# Patient Record
Sex: MALE | Race: WHITE | NOT HISPANIC OR LATINO | Employment: FULL TIME | URBAN - METROPOLITAN AREA
[De-identification: names, ages, dates, MRNs, and addresses within clinical notes are randomized per-mention and may not be internally consistent; named-entity substitution may affect disease eponyms.]

---

## 2020-05-11 ENCOUNTER — OFFICE VISIT (OUTPATIENT)
Dept: URGENT CARE | Facility: CLINIC | Age: 40
End: 2020-05-11
Payer: COMMERCIAL

## 2020-05-11 VITALS
SYSTOLIC BLOOD PRESSURE: 129 MMHG | TEMPERATURE: 98.9 F | DIASTOLIC BLOOD PRESSURE: 85 MMHG | BODY MASS INDEX: 28.07 KG/M2 | RESPIRATION RATE: 18 BRPM | WEIGHT: 164.4 LBS | OXYGEN SATURATION: 99 % | HEIGHT: 64 IN | HEART RATE: 91 BPM

## 2020-05-11 DIAGNOSIS — Z20.822 SUSPECTED COVID-19 VIRUS INFECTION: Primary | ICD-10-CM

## 2020-05-11 DIAGNOSIS — R05.9 COUGH: ICD-10-CM

## 2020-05-11 DIAGNOSIS — R06.02 SHORTNESS OF BREATH: ICD-10-CM

## 2020-05-11 PROCEDURE — U0003 INFECTIOUS AGENT DETECTION BY NUCLEIC ACID (DNA OR RNA); SEVERE ACUTE RESPIRATORY SYNDROME CORONAVIRUS 2 (SARS-COV-2) (CORONAVIRUS DISEASE [COVID-19]), AMPLIFIED PROBE TECHNIQUE, MAKING USE OF HIGH THROUGHPUT TECHNOLOGIES AS DESCRIBED BY CMS-2020-01-R: HCPCS | Performed by: NURSE PRACTITIONER

## 2020-05-11 PROCEDURE — 99203 OFFICE O/P NEW LOW 30 MIN: CPT | Performed by: NURSE PRACTITIONER

## 2020-05-11 RX ORDER — ALBUTEROL SULFATE 90 UG/1
2 AEROSOL, METERED RESPIRATORY (INHALATION) EVERY 4 HOURS PRN
Qty: 8.5 G | Refills: 0 | Status: SHIPPED | OUTPATIENT
Start: 2020-05-11

## 2020-05-13 ENCOUNTER — TELEPHONE (OUTPATIENT)
Dept: URGENT CARE | Facility: CLINIC | Age: 40
End: 2020-05-13

## 2020-05-13 LAB — SARS-COV-2 RNA SPEC QL NAA+PROBE: NOT DETECTED

## 2020-05-16 ENCOUNTER — TELEPHONE (OUTPATIENT)
Dept: URGENT CARE | Facility: CLINIC | Age: 40
End: 2020-05-16

## 2020-05-16 DIAGNOSIS — B96.89 ACUTE BACTERIAL BRONCHITIS: Primary | ICD-10-CM

## 2020-05-16 DIAGNOSIS — J20.8 ACUTE BACTERIAL BRONCHITIS: Primary | ICD-10-CM

## 2020-05-16 RX ORDER — BENZONATATE 100 MG/1
100 CAPSULE ORAL 3 TIMES DAILY PRN
Qty: 20 CAPSULE | Refills: 0 | Status: SHIPPED | OUTPATIENT
Start: 2020-05-16

## 2020-05-16 RX ORDER — AZITHROMYCIN 250 MG/1
TABLET, FILM COATED ORAL
Qty: 6 TABLET | Refills: 0 | Status: SHIPPED | OUTPATIENT
Start: 2020-05-16 | End: 2020-05-21

## 2020-05-27 ENCOUNTER — TELEPHONE (OUTPATIENT)
Dept: URGENT CARE | Facility: CLINIC | Age: 40
End: 2020-05-27

## 2021-04-01 DIAGNOSIS — Z23 ENCOUNTER FOR IMMUNIZATION: ICD-10-CM

## 2021-04-21 ENCOUNTER — IMMUNIZATIONS (OUTPATIENT)
Dept: FAMILY MEDICINE CLINIC | Facility: HOSPITAL | Age: 41
End: 2021-04-21

## 2021-04-21 DIAGNOSIS — Z23 ENCOUNTER FOR IMMUNIZATION: Primary | ICD-10-CM

## 2021-04-21 PROCEDURE — 91300 SARS-COV-2 / COVID-19 MRNA VACCINE (PFIZER-BIONTECH) 30 MCG: CPT

## 2021-04-21 PROCEDURE — 0001A SARS-COV-2 / COVID-19 MRNA VACCINE (PFIZER-BIONTECH) 30 MCG: CPT

## 2021-05-15 ENCOUNTER — IMMUNIZATIONS (OUTPATIENT)
Dept: FAMILY MEDICINE CLINIC | Facility: HOSPITAL | Age: 41
End: 2021-05-15

## 2021-05-15 DIAGNOSIS — Z23 ENCOUNTER FOR IMMUNIZATION: Primary | ICD-10-CM

## 2021-05-15 PROCEDURE — 91300 SARS-COV-2 / COVID-19 MRNA VACCINE (PFIZER-BIONTECH) 30 MCG: CPT

## 2021-05-15 PROCEDURE — 0002A SARS-COV-2 / COVID-19 MRNA VACCINE (PFIZER-BIONTECH) 30 MCG: CPT

## 2021-06-03 ENCOUNTER — OFFICE VISIT (OUTPATIENT)
Dept: URGENT CARE | Facility: CLINIC | Age: 41
End: 2021-06-03
Payer: COMMERCIAL

## 2021-06-03 VITALS
HEIGHT: 66 IN | BODY MASS INDEX: 25.71 KG/M2 | OXYGEN SATURATION: 97 % | TEMPERATURE: 97.7 F | DIASTOLIC BLOOD PRESSURE: 84 MMHG | RESPIRATION RATE: 18 BRPM | SYSTOLIC BLOOD PRESSURE: 151 MMHG | WEIGHT: 160 LBS | HEART RATE: 102 BPM

## 2021-06-03 DIAGNOSIS — R53.83 FATIGUE, UNSPECIFIED TYPE: Primary | ICD-10-CM

## 2021-06-03 DIAGNOSIS — B34.9 VIRAL SYNDROME: ICD-10-CM

## 2021-06-03 PROCEDURE — G0382 LEV 3 HOSP TYPE B ED VISIT: HCPCS | Performed by: NURSE PRACTITIONER

## 2021-06-03 PROCEDURE — U0005 INFEC AGEN DETEC AMPLI PROBE: HCPCS | Performed by: NURSE PRACTITIONER

## 2021-06-03 PROCEDURE — S9083 URGENT CARE CENTER GLOBAL: HCPCS | Performed by: NURSE PRACTITIONER

## 2021-06-03 PROCEDURE — U0003 INFECTIOUS AGENT DETECTION BY NUCLEIC ACID (DNA OR RNA); SEVERE ACUTE RESPIRATORY SYNDROME CORONAVIRUS 2 (SARS-COV-2) (CORONAVIRUS DISEASE [COVID-19]), AMPLIFIED PROBE TECHNIQUE, MAKING USE OF HIGH THROUGHPUT TECHNOLOGIES AS DESCRIBED BY CMS-2020-01-R: HCPCS | Performed by: NURSE PRACTITIONER

## 2021-06-03 RX ORDER — BENZONATATE 100 MG/1
100 CAPSULE ORAL 3 TIMES DAILY PRN
Qty: 20 CAPSULE | Refills: 0 | Status: SHIPPED | OUTPATIENT
Start: 2021-06-03

## 2021-06-03 NOTE — PATIENT INSTRUCTIONS
Will test for COVID-19  You need to isolate into results are obtain  Rest and drink extra fluids  Tylenol as needed for pain or fever  Over-the-counter cough and cold medications as needed  Start vitamin D3 2000 IU po daily, Vitamin C 1 gram PO q 12 hours and multivitamin daily  Follow up with PCP if no improvement, call prior to any doctor visits if COVID testing is not back  Go to the ER with any worsening symptoms, chest pain, shortness of breath, difficulty breathing, lethargy, confusion, dehydration or change in skin color  You can try imodium for diarrhea  [place] foods and clear liquids      COVID-19 Home Care Guidelines    Your healthcare provider and/or public health staff have evaluated you and have determined that you do not need to remain in the hospital at this time  At this time you can be isolated at home where you will be monitored by staff from your local or state health department  You should carefully follow the prevention and isolation steps below until a healthcare provider or local or state health department says that you can return to your normal activities  Stay home except to get medical care    People who are mildly ill with COVID-19 are able to isolate at home during their illness  You should restrict activities outside your home, except for getting medical care  Do not go to work, school, or public areas  Avoid using public transportation, ride-sharing, or taxis  Separate yourself from other people and animals in your home    People: As much as possible, you should stay in a specific room and away from other people in your home  Also, you should use a separate bathroom, if available  Animals: You should restrict contact with pets and other animals while you are sick with COVID-19, just like you would around other people   Although there have not been reports of pets or other animals becoming sick with COVID-19, it is still recommended that people sick with COVID-19 limit contact with animals until more information is known about the virus  When possible, have another member of your household care for your animals while you are sick  If you are sick with COVID-19, avoid contact with your pet, including petting, snuggling, being kissed or licked, and sharing food  If you must care for your pet or be around animals while you are sick, wash your hands before and after you interact with pets and wear a facemask  See COVID-19 and Animals for more information  Call ahead before visiting your doctor    If you have a medical appointment, call the healthcare provider and tell them that you have or may have COVID-19  This will help the healthcare providers office take steps to keep other people from getting infected or exposed  Wear a facemask    You should wear a facemask when you are around other people (e g , sharing a room or vehicle) or pets and before you enter a healthcare providers office  If you are not able to wear a facemask (for example, because it causes trouble breathing), then people who live with you should not stay in the same room with you, or they should wear a facemask if they enter your room  Cover your coughs and sneezes    Cover your mouth and nose with a tissue when you cough or sneeze  Throw used tissues in a lined trash can  Immediately wash your hands with soap and water for at least 20 seconds or, if soap and water are not available, clean your hands with an alcohol-based hand  that contains at least 60% alcohol  Clean your hands often    Wash your hands often with soap and water for at least 20 seconds, especially after blowing your nose, coughing, or sneezing; going to the bathroom; and before eating or preparing food  If soap and water are not readily available, use an alcohol-based hand  with at least 60% alcohol, covering all surfaces of your hands and rubbing them together until they feel dry    Soap and water are the best option if hands are visibly dirty  Avoid touching your eyes, nose, and mouth with unwashed hands  Avoid sharing personal household items    You should not share dishes, drinking glasses, cups, eating utensils, towels, or bedding with other people or pets in your home  After using these items, they should be washed thoroughly with soap and water  Clean all high-touch surfaces everyday    High touch surfaces include counters, tabletops, doorknobs, bathroom fixtures, toilets, phones, keyboards, tablets, and bedside tables  Also, clean any surfaces that may have blood, stool, or body fluids on them  Use a household cleaning spray or wipe, according to the label instructions  Labels contain instructions for safe and effective use of the cleaning product including precautions you should take when applying the product, such as wearing gloves and making sure you have good ventilation during use of the product  Monitor your symptoms    Seek prompt medical attention if your illness is worsening (e g , difficulty breathing)  Before seeking care, call your healthcare provider and tell them that you have, or are being evaluated for, COVID-19  Put on a facemask before you enter the facility  These steps will help the healthcare providers office to keep other people in the office or waiting room from getting infected or exposed  Ask your healthcare provider to call the local or state health department  Persons who are placed under active monitoring or facilitated self-monitoring should follow instructions provided by their local health department or occupational health professionals, as appropriate  If you have a medical emergency and need to call 911, notify the dispatch personnel that you have, or are being evaluated for COVID-19  If possible, put on a facemask before emergency medical services arrive      Discontinuing home isolation    Patients with confirmed COVID-19 should remain under home isolation precautions until the following conditions are met:   - They have had no fever for at least 24 hours (that is one full day of no fever without the use medicine that reduces fevers)  AND  - other symptoms have improved (for example, when their cough or shortness of breath have improved)  AND  - If had mild or moderate illness, at least 10 days have passed since their symptoms first appeared or if severe illness (needed oxygen) or immunosuppressed, at least 20 days have passed since symptoms first appeared  Patients with confirmed COVID-19 should also notify close contacts (including their workplace) and ask that they self-quarantine  Currently, close contact is defined as being within 6 feet for 15 minutes or more from the period 24 hours starting 48 hours before symptom onset to the time at which the patient went into isolation  Close contacts of patients diagnosed with COVID-19 should be instructed by the patient to self-quarantine for 14 days from the last time of their last contact with the patient       Source: [employer]

## 2021-06-03 NOTE — PROGRESS NOTES
[address] [facility]        NAME: Yasmin Chi is a 39 y o  male  : 1980    MRN: [id number]  DATE: Ev 3, 2021  TIME: 1:59 PM    Assessment and Plan   Fatigue, unspecified type [R53 83]  1  Fatigue, unspecified type  Novel Coronavirus (Covid-19),PCR Formerly named [facility] - Office Collection   2  Viral syndrome  benzonatate (TESSALON PERLES) 100 mg capsule         Patient Instructions     Patient Instructions   Will test for COVID-19  You need to isolate into results are obtain  Rest and drink extra fluids  Tylenol as needed for pain or fever  Over-the-counter cough and cold medications as needed  Start vitamin D3 2000 IU po daily, Vitamin C 1 gram PO q 12 hours and multivitamin daily  Follow up with PCP if no improvement, call prior to any doctor visits if COVID testing is not back  Go to the ER with any worsening symptoms, chest pain, shortness of breath, difficulty breathing, lethargy, confusion, dehydration or change in skin color  You can try imodium for diarrhea  [place] foods and clear liquids      COVID-19 Home Care Guidelines    Your healthcare provider and/or public health staff have evaluated you and have determined that you do not need to remain in the hospital at this time  At this time you can be isolated at home where you will be monitored by staff from your local or state health department  You should carefully follow the prevention and isolation steps below until a healthcare provider or local or state health department says that you can return to your normal activities  Stay home except to get medical care    People who are mildly ill with COVID-19 are able to isolate at home during their illness  You should restrict activities outside your home, except for getting medical care  Do not go to work, school, or public areas  Avoid using public transportation, ride-sharing, or taxis      Separate yourself from other people and animals in your home    People: As much as possible, you should stay in a specific room and away from other people in your home  Also, you should use a separate bathroom, if available  Animals: You should restrict contact with pets and other animals while you are sick with COVID-19, just like you would around other people  Although there have not been reports of pets or other animals becoming sick with COVID-19, it is still recommended that people sick with COVID-19 limit contact with animals until more information is known about the virus  When possible, have another member of your household care for your animals while you are sick  If you are sick with COVID-19, avoid contact with your pet, including petting, snuggling, being kissed or licked, and sharing food  If you must care for your pet or be around animals while you are sick, wash your hands before and after you interact with pets and wear a facemask  See COVID-19 and Animals for more information  Call ahead before visiting your doctor    If you have a medical appointment, call the healthcare provider and tell them that you have or may have COVID-19  This will help the healthcare providers office take steps to keep other people from getting infected or exposed  Wear a facemask    You should wear a facemask when you are around other people (e g , sharing a room or vehicle) or pets and before you enter a healthcare providers office  If you are not able to wear a facemask (for example, because it causes trouble breathing), then people who live with you should not stay in the same room with you, or they should wear a facemask if they enter your room  Cover your coughs and sneezes    Cover your mouth and nose with a tissue when you cough or sneeze  Throw used tissues in a lined trash can  Immediately wash your hands with soap and water for at least 20 seconds or, if soap and water are not available, clean your hands with an alcohol-based hand  that contains at least 60% alcohol      Clean your hands often    Wash your hands often with soap and water for at least 20 seconds, especially after blowing your nose, coughing, or sneezing; going to the bathroom; and before eating or preparing food  If soap and water are not readily available, use an alcohol-based hand  with at least 60% alcohol, covering all surfaces of your hands and rubbing them together until they feel dry  Soap and water are the best option if hands are visibly dirty  Avoid touching your eyes, nose, and mouth with unwashed hands  Avoid sharing personal household items    You should not share dishes, drinking glasses, cups, eating utensils, towels, or bedding with other people or pets in your home  After using these items, they should be washed thoroughly with soap and water  Clean all high-touch surfaces everyday    High touch surfaces include counters, tabletops, doorknobs, bathroom fixtures, toilets, phones, keyboards, tablets, and bedside tables  Also, clean any surfaces that may have blood, stool, or body fluids on them  Use a household cleaning spray or wipe, according to the label instructions  Labels contain instructions for safe and effective use of the cleaning product including precautions you should take when applying the product, such as wearing gloves and making sure you have good ventilation during use of the product  Monitor your symptoms    Seek prompt medical attention if your illness is worsening (e g , difficulty breathing)  Before seeking care, call your healthcare provider and tell them that you have, or are being evaluated for, COVID-19  Put on a facemask before you enter the facility  These steps will help the healthcare providers office to keep other people in the office or waiting room from getting infected or exposed  Ask your healthcare provider to call the local or [facility] health department   Persons who are placed under active monitoring or facilitated self-monitoring should follow instructions provided by their local health department or occupational health professionals, as appropriate  If you have a medical emergency and need to call 911, notify the dispatch personnel that you have, or are being evaluated for COVID-19  If possible, put on a facemask before emergency medical services arrive  Discontinuing home isolation    Patients with confirmed COVID-19 should remain under home isolation precautions until the following conditions are met:   - They have had no fever for at least 24 hours (that is one full day of no fever without the use medicine that reduces fevers)  AND  - other symptoms have improved (for example, when their cough or shortness of breath have improved)  AND  - If had mild or moderate illness, at least 10 days have passed since their symptoms first appeared or if severe illness (needed oxygen) or immunosuppressed, at least 20 days have passed since symptoms first appeared  Patients with confirmed COVID-19 should also notify close contacts (including their workplace) and ask that they self-quarantine  Currently, close contact is defined as being within 6 feet for 15 minutes or more from the period 24 hours starting 48 hours before symptom onset to the time at which the patient went into isolation  Close contacts of patients diagnosed with COVID-19 should be instructed by the patient to self-quarantine for 14 days from the last time of their last contact with the patient  Source: [employer]          Chief Complaint     Chief Complaint   Patient presents with    Headache     started tuesday on and off     Cough     constant cough    Fatigue     also got loose stool since yesterday         History of Present Illness   Anaid Sang presents to the clinic c/o    This is a 30-year-old male here today with complaints of cough, slight congestion, fatigue, fever and diarrhea    He has had 3 episodes of loose stools today   Fever 102 2 days ago  He denies any loss of taste or smell  He does note he had his 2nd COVID vaccine on 05/15/2021  He then had an exposure to COVID-19 about 1 5 weeks ago  Symptoms started 2 days ago  He denies any chest pain or shortness of breath  Review of Systems   Review of Systems   Constitutional: Positive for activity change, chills, fatigue and fever  HENT: Positive for congestion and rhinorrhea  Negative for sore throat  Respiratory: Positive for cough  Negative for shortness of breath and wheezing  Cardiovascular: Negative  Negative for palpitations  Gastrointestinal: Positive for diarrhea  Negative for abdominal pain and vomiting  Musculoskeletal: Negative  Neurological: Negative  Psychiatric/Behavioral: Negative  Current Medications     No long-term medications on file  Current Allergies     Allergies as of 06/03/2021    (No Known Allergies)            The following portions of the patient's history were reviewed and updated as appropriate: allergies, current medications, past family history, past medical history, past social history, past surgical history and problem list     Objective   /84   Pulse 102   Temp 97 7 °F (36 5 °C)   Resp 18   Ht 5' 6" (1 676 m)   Wt 72 6 kg (160 lb)   SpO2 97%   BMI 25 82 kg/m²        Physical Exam     Physical Exam  Vitals signs and nursing note reviewed  Constitutional:       General: He is not in acute distress  Appearance: Normal appearance  He is not ill-appearing  HENT:      Right Ear: Tympanic membrane normal       Left Ear: Tympanic membrane normal       Nose: Congestion present  Cardiovascular:      Rate and Rhythm: Normal rate and regular rhythm  Pulses: Normal pulses  Heart sounds: Normal heart sounds  Pulmonary:      Effort: Pulmonary effort is normal  No respiratory distress  Breath sounds: Normal breath sounds  No wheezing, rhonchi or rales     Chest:      Chest wall: No tenderness  Neurological:      Mental Status: He is alert and oriented to person, place, and time  Psychiatric:         Mood and Affect: Mood normal          Behavior: Behavior normal          Thought Content:  Thought content normal          Judgment: Judgment normal

## 2021-06-04 LAB — SARS-COV-2 RNA RESP QL NAA+PROBE: NEGATIVE

## 2021-06-07 ENCOUNTER — DOCUMENTATION (OUTPATIENT)
Dept: URGENT CARE | Facility: CLINIC | Age: 41
End: 2021-06-07

## 2021-06-07 NOTE — LETTER
June 7, 2021    Patient:  Johnie Leonard  YOB: 1980  Date of Last Encounter: 6/3/2021    To whom it may concern:    Johnie Leonard has tested negative for COVID-19 (Coronavirus)  He may return to work on 6/8/2021        Sincerely,        Bethany Woods RN

## 2021-06-07 NOTE — PROGRESS NOTES
Pt came in for a printed copy of his Lab results and a work note  Provider was notified , RN wrote note

## 2021-06-08 ENCOUNTER — TELEPHONE (OUTPATIENT)
Dept: URGENT CARE | Facility: CLINIC | Age: 41
End: 2021-06-08

## 2021-06-09 ENCOUNTER — TELEPHONE (OUTPATIENT)
Dept: URGENT CARE | Facility: CLINIC | Age: 41
End: 2021-06-09

## 2021-06-09 NOTE — TELEPHONE ENCOUNTER
Patient called in requesting a note at including today's date as he was unable to return to work today due to not having a note yesterday  Will issue patient a new note

## 2021-06-09 NOTE — LETTER
June 9, 2021    Patient:  Betty Fuentes  YOB: 1980  Date of Last Encounter: 6/8/2021    To whom it may concern:    Betty Fuentes has tested negative for COVID-19 (Coronavirus)  He may return to work on 06/09/2021, please excuse from 06/01/2021-06/08/2021      Sincerely,        [address] [facility], CRNP

## 2021-06-09 NOTE — LETTER
June 9, 2021    Patient:  Dom Chavez  YOB: 1980  Date of Last Encounter: 6/9/2021    To whom it may concern:    Dom Chavez has tested negative for COVID-19 (Coronavirus)  He may return to work on 06/10/2021, please excuse from 06/01/2021-06/09/2021      Sincerely,        [address] [facility], CRNP

## 2021-08-18 ENCOUNTER — AMB VIDEO VISIT (OUTPATIENT)
Dept: OTHER | Facility: HOSPITAL | Age: 41
End: 2021-08-18

## 2021-08-18 PROCEDURE — ECARE PR SL URGENT CARE VIRTUAL VISIT: Performed by: FAMILY MEDICINE

## 2021-08-18 NOTE — CARE ANYWHERE EVISITS
Visit Summary for Jessica Richardson - Gender: Male - Date of Birth: [id number]  Date: [id number] - Duration: 1 minutes  Patient: Jessica Mares   [place]  Provider: Valerie Bosch    Patient Contact Information  Address  [address] [place]; [place] [address]  [phone]    Visit Topics    Sick Slip  Reason [ILLNESS]  Remarks [Please excuse from work due to COVID-like symptoms  Self-isolation has been advised  COVID-19 testing has been recommended  If positive test result, may stop self-isolating when:No fever for at least 24 hours without   fever-reducing medicinesANDOther symptoms have improved (such as cough or shortness of breath) ANDAt least 10 days have passed since symptoms first appeared  If you negative test result, may stop self-isolating when:No fever for at least 24   hours without fever-reducing medicinesANDOther symptoms have improved (such as cough or shortness of breath)  ]  Triage Questions   What is your current physical address in the event of a medical emergency? Answer []  Are you allergic to any medications? Answer []  Are you now or could you be pregnant? Answer []  Do you have any immune system compromise or chronic lung   disease? Answer []  Do you have any vulnerable family members in the home (infant, pregnant, cancer, elderly)? Answer []     Conversation Transcripts  [0A][0A] [Notification] Practice Staff, Global Staff, will help you prepare for your visit  She is assisting Valerie Bosch, Family Physician [0A][Practice Staff] Hello, and thank you for connecting  While you are waiting for the doctor, are there   any questions I can answer for you about our service? Please contact customer service if you have questions about billing, insurance, or technical issues  Visits work best with a stable WiFi connection, so please make sure you are connected before we   begin [0A][Notification] Practice Staff has left the room  [0A][Notification] You are connected with Valerie Bosch Family Physician [0A][Notification] Alfonzo Don is located in [place]  [0A][Notification] Alfonzo Don has shared   health history  Jesus Rodríguez  [0A]    Diagnosis  Cough  Headache  Acute upper respiratory infection, unspecified  Contact w and exposure to oth viral communicable diseases    Procedures  Value: [id number] Code: CPT-4 UNLISTED E&M SERVICE    Medications Prescribed    benzonatate  Dose : 1 capsule  Route : oral  Frequency : 3 times a day  Until directed to stop  Patient Instructions : prn cough; do not crush or chew  Refills : 0  Instructions to the Pharmacist : Substitutions allowed    Ventolin HFA  Dose : 2 puffs  Route : inhalation  Frequency : every 4 hours  Until directed to stop  Patient Instructions : prn cough/wheeze/SOB  Refills : 0  Instructions to the Pharmacist : Substitutions allowed      Provider Notes  [0A][0A] We strongly encourage you to share the following record of[0A]today's visit with your primary care physician  [0A]Contact phone[0A]number-[0A]Mode of[0A]Communication-Video then Phone d/t video   disconnection[0A][0A][0A]Chief[0A]complaint/HPI-Onset of symptoms 3 days ago  Reports being fully vaccinated for COVID  [0A]Pertinent symptoms[0A]During this illness, did the patient experience any of the following[0A]symptoms (yes or no,   describe)? [0A]Fever>100 4 F (38 C)-N[0A]Subjective fever (felt feverish)-Y  Chills  [0A]Cough (describe, if yes)-Y  Productive cough  Chest congestion  Some wheezing  [0A]Shortness of breath (describe, if yes)-N[0A]Chest pain or tightness-N[0A]Sore   throat-N[0A]Lost sense of smell or taste (anosmia or ageusia)-[0A]Runny nose/Congestion-Y/Y[0A]Sneezing-N[0A]Muscle aches-[0A]Headache-Y[0A]Nausea or vomiting-N[0A]Diarrhea-N[0A]Other, specify-[0A] [0A]COVID-19 Risks[0A]Does the patient have any of the   following medical[0A]conditions (yes or no, describe)?       [0A]Asthma-N[0A]COPD or emphysema-N[0A]Chronic heart disease-N[0A]Recurrent pneumonia-N[0A]Advanced or poorly controlled diabetes-N[0A]Active or recent cancer-N[0A]Significant immune   suppression-N[0A]Currently taking immunosuppressant medications-N[0A]Age over 65?-N[0A] [0A]Does the patient live with a chronically ill,[0A]immunosuppressed or household member over 72 (yes or no, describe)? -N[0A] [0A]COVID-19 Exposure[0A]Risk[0A]Does   the patient suspect they have been in close contact[0A]with a person with COVID-19 (yes, no describe)? -N[0A] [0A]COVID-19 Test[0A]Results[0A]Has the patient been tested within the last 3 months for[0A]SARS-CoV-2 virus (yes, no, results if yes)? -N[0A]   [0A]Past Medical[0A]History-Allergic rhinitis[0A]Medications-Claritin, Flonase[0A]Allergies-NKDA[0A]Past Surgical[0A]History-[0A]Smoking History-N[0A]If female,[0A]currently pregnant or nursing (yes, no, N/A)? -N/A[0A] [0A]Physical   Exam[0A]General-Slightly ill-appearing, non-toxic, NAD  [0A]HEENT-NCAT  Nose-congestion  Eyes and Ears-normal appearance  MMM and pink  SInuses-NTTP  [0A]Neck/lymph-Supple  [0A]Resp-Frequent tight, paroxysmal cough  No respiratory   distress  [0A]Abdomen-[0A]Skin-No abnormalities of visible skin  [0A]Other exam-[0A] [0A]Assessment[0A]1  COVID-19 risk[0A]    - COVID-Like[0A]Illness, Stable (CLI-S)[0A] [0A]2  Diagnosis and[0A]ICD-10 code[0A]R05 (cough)[0A]R51 9 (headache)[0A]J06 9   (acute URI)[0A]Z20 828 (suspected exposure to other viral communicable[0A]diseases)[0A][0A][0A]Plan[0A]1  COVID-Like Illness Stable (CLI-S)[0A]You have been diagnosed with a viral respiratory infection[0A]that may be due to coronavirus (Covid-19)  At this time, your provider has[0A]determined that you do not require an emergency evaluation  If your symptoms[0A]progress or worsen, it may be necessary to seek additional care in person  [0A] [0A]2  Testing for COVID-19[0A]Lab testing for   COVID-19 might be recommended if it is[0A]available in your community   [0A] [0A]To locate[0A]potential test sites in your area, please search the following   [employer]  [email or web address] [0A][employer]   ([email or web address])[0A][0A][0A]Lab results[0A]Positive test:[0A]These tests are not 100% perfect but if you tested positive for COVID-19, this[0A]simply confirms that COVID-19 is the likely cause of your symptoms  You do not[0A]need to take further action unless you are experiencing worsening of your[0A]condition  You should follow all of the above guidance to avoid infecting[0A]others around you  [0A]If you have a positive[0A]test result you may stop   self-isolating when:[0A]You have had no fever for at least 24 hours without[0A]fever-reducing medicines [0A]AND[0A]Other symptoms have improved (such as cough or shortness of[0A]breath)[0A] AND[0A]At least 10 days have passed since your symptoms   first[0A]appeared[0A]Also, after isolation ends, you will need to be especially[0A]careful to avoid close contact and wear a mask in the presence of other people,[0A]even in the home  [0A]Also, if you return to work outside the home you should[0A]check   your temperature every day prior to starting your shift and only work if[0A]it is normal  [0A]Negative test:[0A]This confirms that COVID-19 is not likely the cause of your symptoms  You[0A]probably are infected with another common respiratory virus  These tests are[0A]not 100% perfect, though, so there is still a small chance that Covid-19 is the[0A]cause of your symptoms  [0A]If you have a[0A]negative test result you may stop self-isolating when:[0A]You have had no fever for at least 24 hours   without[0A]fever-reducing medicines [0A]AND[0A]Other symptoms have improved (such as cough or shortness of[0A]breath)[0A] [0A]3         If you are told to self-isolate, please[0A]do the following:[0A]Stay at home except to receive medical care[0A]Separate   yourself from other people and animals in the home[0A]and if possible, use separate bathrooms[0A]Call ahead before visiting any health facility[0A]Wear a facemask if around others[0A]Cover your coughs and sneezes[0A]Clean your hands often[0A]Clean   âhigh touchâ surfaces every day[0A]Monitor your symptoms for worsening and seek medical[0A]attention immediately if your illness worsens (for example difficulty[0A]breathing, dizziness, dark colored urine)  Before seeking care, call ahead to[0A]the   facility and tell them that you may have Covid-19  Put on a facemask (or[0A]bandana) before entering the facility [0A][0A][0A]Other Patient[0A]Instructions[0A]1  Symptom relief[0A]You may use supportive treatment with in-room humidifier,[0A]fluids,   rest, and Mucinex or other guaifenesin-containing over the counter[0A]cough products  Tylenol and Advil can help with the fever and aches  [0A] [0A]2  Prescriptions (if any)-albuterol hfa, Tessalon Perles (benzonatate)[0A] [0A]3  Prevent   Infection[0A]Please help[0A]prevent the spread of respiratory diseases by doing the following:[0A]Avoid close contact with people who are sick  [0A]Avoid touching your eyes, nose, and mouth  [2R]GIXS home when you are sick  [0A]Cover your cough or sneeze   with a tissue, then throw the[0A]tissue in the trash  [0A]Clean and disinfect frequently touched objects and surfaces[0A]using a regular household cleaning spray or wipe  [0A]Wear a facemask when in public to protect yourself and[0A]others[0A]Wash your   hands often with soap and water for at least 20 seconds,[0A]especially after going to the bathroom; before eating; and after blowing your[0A]nose, coughing, or sneezing  [0A] [0A]4         Follow up[0A]Please reconnect for another online visit or see your   PCP or[0A]urgent care provider if symptoms worsen or new symptoms appear at any point, or[0A]if still with fever or additional symptoms after a 3-4 more days  [0A]If you received a prescription at this visit and have any[0A]questions or problems with the   prescription, call [phone] for assistance  [0A]Patient indicates understanding and agrees with plan  [0A]Please print a copy of this note and send it to your regular[0A]doctor or take it to your next visit so it may be included in your   medical[0A]record  [0A]Please see your PCP on a regular basis for prevention[0A]services, sooner for follow up of chronic medical conditions      [0A][0A][0A] [0A] [0A][0A][0A]    Electronically signed by: Rosa Garcia(NPI [id number])

## 2021-08-27 ENCOUNTER — AMB VIDEO VISIT (OUTPATIENT)
Dept: OTHER | Facility: HOSPITAL | Age: 41
End: 2021-08-27

## 2021-08-27 PROCEDURE — ECARE PR SL URGENT CARE VIRTUAL VISIT: Performed by: PHYSICIAN ASSISTANT

## 2021-08-27 NOTE — CARE ANYWHERE EVISITS
Visit Summary for Stefany Little - Gender: Male - Date of Birth: [id number]  Date: [id number] - Duration: 3 minutes  Patient: Stefany Little  Provider: Sheela Ureña PA-C    Patient Contact Information  Address  [address] [place]; [place]  [phone]    Visit Topics    Triage Questions   What is your current physical address in the event of a medical emergency? Answer []  Are you allergic to any medications? Answer []  Are you now or could you be pregnant? Answer []  Do you have any immune system compromise or chronic lung   disease? Answer []  Do you have any vulnerable family members in the home (infant, pregnant, cancer, elderly)? Answer []     Conversation Transcripts  [0A][0A] [Notification] You are connected with Sheela Ureña PA-C, Urgent Care Specialist [0A][Notification] Stefany Little is located in [place]  [0A][Notification] Stefany Little has shared health history  Nichelle Colunga  [0A]    Diagnosis    Procedures  Value: [id number] Code: CPT-4 UNLISTED E&M SERVICE    Medications Prescribed    No prescriptions ordered    Electronically signed by: Jluis Jett PA-C(NPI [id number])

## 2022-01-04 ENCOUNTER — NURSE TRIAGE (OUTPATIENT)
Dept: OTHER | Facility: OTHER | Age: 42
End: 2022-01-04

## 2022-01-04 DIAGNOSIS — U07.1 COVID-19: Primary | ICD-10-CM

## 2022-01-04 PROCEDURE — U0003 INFECTIOUS AGENT DETECTION BY NUCLEIC ACID (DNA OR RNA); SEVERE ACUTE RESPIRATORY SYNDROME CORONAVIRUS 2 (SARS-COV-2) (CORONAVIRUS DISEASE [COVID-19]), AMPLIFIED PROBE TECHNIQUE, MAKING USE OF HIGH THROUGHPUT TECHNOLOGIES AS DESCRIBED BY CMS-2020-01-R: HCPCS | Performed by: FAMILY MEDICINE

## 2022-01-04 PROCEDURE — U0005 INFEC AGEN DETEC AMPLI PROBE: HCPCS | Performed by: FAMILY MEDICINE

## 2022-01-04 NOTE — TELEPHONE ENCOUNTER
Regarding: Covid-Exposure and Cough  ----- Message from [facility] sent at 1/4/2022 12:10 AM EST -----  "I was exposed to a positive Covid friend and now I have a cough, feel hot, and diarrhea "

## 2022-01-04 NOTE — TELEPHONE ENCOUNTER
Reason for Disposition   [1] COVID-19 infection suspected by caller or triager AND [2] mild symptoms (cough, fever, or others) AND [3] has not gotten tested yet    Answer Assessment - Initial Assessment Questions  1  COVID-19 DIAGNOSIS: "Who made your COVID-19 diagnosis?" "Was it confirmed by a positive lab test?" If not diagnosed by a HCP, ask "Are there lots of cases (community spread) where you live?" Note: See public health department website, if unsure  Increased spread  2  COVID-19 EXPOSURE: "Was there any known exposure to COVID before the symptoms began?" [facility] Definition of close contact: within 6 feet (2 meters) for a total of 15 minutes or more over a 24-hour period  12/28 to friends  3  ONSET: "When did the COVID-19 symptoms start?"       1/1  4  WORST SYMPTOM: "What is your worst symptom?" (e g , cough, fever, shortness of breath, muscle aches)      Cough  5  COUGH: "Do you have a cough?" If Yes, ask: "How bad is the cough?"        Persistent cough  6  FEVER: "Do you have a fever?" If Yes, ask: "What is your temperature, how was it measured, and when did it start?"      100 1  7  RESPIRATORY STATUS: "Describe your breathing?" (e g , shortness of breath, wheezing, unable to speak)       Normal otherwise  8  BETTER-SAME-WORSE: "Are you getting better, staying the same or getting worse compared to yesterday?"  If getting worse, ask, "In what way?"      Worse  9  HIGH RISK DISEASE: "Do you have any chronic medical problems?" (e g , asthma, heart or lung disease, weak immune system, obesity, etc )      Denies  10  VACCINE: "Have you gotten the COVID-19 vaccine?" If Yes ask: "Which one, how many shots, when did you get it?"        Pfizer three doses  11   OTHER SYMPTOMS: "Do you have any other symptoms?"  (e g , chills, fatigue, headache, loss of smell or taste, muscle pain, sore throat; new loss of smell or taste especially support the diagnosis of COVID-19)        Runny nose, feel hot    Protocols used: CORONAVIRUS (COVID-19) DIAGNOSED OR SUSPECTED-ADULT-AH

## 2024-01-21 ENCOUNTER — OFFICE VISIT (OUTPATIENT)
Dept: URGENT CARE | Facility: CLINIC | Age: 44
End: 2024-01-21
Payer: COMMERCIAL

## 2024-01-21 VITALS
HEART RATE: 114 BPM | SYSTOLIC BLOOD PRESSURE: 114 MMHG | RESPIRATION RATE: 18 BRPM | DIASTOLIC BLOOD PRESSURE: 80 MMHG | TEMPERATURE: 98.8 F | OXYGEN SATURATION: 98 %

## 2024-01-21 DIAGNOSIS — R05.1 ACUTE COUGH: ICD-10-CM

## 2024-01-21 DIAGNOSIS — J11.1 INFLUENZA: Primary | ICD-10-CM

## 2024-01-21 PROCEDURE — 99213 OFFICE O/P EST LOW 20 MIN: CPT | Performed by: PHYSICAL MEDICINE & REHABILITATION

## 2024-01-21 PROCEDURE — 87636 SARSCOV2 & INF A&B AMP PRB: CPT | Performed by: PHYSICAL MEDICINE & REHABILITATION

## 2024-01-21 RX ORDER — ALBUTEROL SULFATE 90 UG/1
2 AEROSOL, METERED RESPIRATORY (INHALATION) EVERY 6 HOURS PRN
Qty: 8.5 G | Refills: 0 | Status: SHIPPED | OUTPATIENT
Start: 2024-01-21

## 2024-01-21 RX ORDER — PREDNISONE 20 MG/1
40 TABLET ORAL DAILY
Qty: 10 TABLET | Refills: 0 | Status: SHIPPED | OUTPATIENT
Start: 2024-01-21 | End: 2024-01-26

## 2024-01-21 NOTE — LETTER
January 21, 2024     Patient: Kaleb Diallo   YOB: 1980   Date of Visit: 1/21/2024       To Whom it May Concern:    Kaleb Diallo was seen in my clinic on 1/21/2024. He may return to work on 1/22/24 .    If you have any questions or concerns, please don't hesitate to call.         Sincerely,          Esther Bowers PA-C        CC: No Recipients

## 2024-01-21 NOTE — PATIENT INSTRUCTIONS
Vitamin D3 2000 IU daily.  Vitamin C 1000mg twice per day.  Multivitamin daily.  Some studies suggest that Zinc 12.5-15mg every 2 hours while awake x 5 days may shorten the duration cold symptoms by 1-2 days.   Fluids and rest.  Nasal saline spray; Afrin if severe congestion (do not use for more than 3 days)  Over the counter cough/cold medications as needed.   Flonase nasal spray.  Tylenol/Ibuprofen for pain/fever.  Salt water gargles and/or chloraseptic spray.  Warm tea with honey.  Warm compresses over sinuses.  Nasal rinses with distilled water.    Follow up with PCP within 3-5 days

## 2024-01-21 NOTE — PROGRESS NOTES
[facility]        NAME: Kaleb Diallo is a 43 y.o. male  : 1980    MRN: [id number]  DATE: 2024  TIME: 3:59 PM    Assessment and Plan   Influenza [J11.1]  1. Influenza        2. Acute cough  Cov/Flu - Lab Collect    albuterol (ProAir HFA) 90 mcg/act inhaler    predniSONE 20 mg tablet            Patient Instructions     Vitamin D3 2000 IU daily.  Vitamin C 1000mg twice per day.  Multivitamin daily.  Some studies suggest that Zinc 12.5-15mg every 2 hours while awake x 5 days may shorten the duration cold symptoms by 1-2 days.   Fluids and rest.  Nasal saline spray; Afrin if severe congestion (do not use for more than 3 days)  Over the counter cough/cold medications as needed.   Flonase nasal spray.  Tylenol/Ibuprofen for pain/fever.  Salt water gargles and/or chloraseptic spray.  Warm tea with honey.  Warm compresses over sinuses.  Nasal rinses with distilled water.    Follow up with PCP in 3-5 days.  Proceed to  ER if symptoms worsen.    Chief Complaint     Chief Complaint   Patient presents with    Flu Symptoms     Pt c/o fever with body aches, chills, cough, congestion that started Thursday night. Pt rapid covid negative at home.         History of Present Illness       Patient presenting with fever, body-aches, chills. Did rapid COVID at home which was negative. Symptoms started Thursday night. Patient took Ibuprofen. Symptoms about the same since Thursday.         Review of Systems   Review of Systems   Constitutional:  Positive for chills, fatigue and fever.   HENT:  Positive for congestion, postnasal drip, sinus pressure and sinus pain. Negative for ear pain, rhinorrhea, sneezing, sore throat and trouble swallowing.    Eyes: Negative.    Respiratory:  Positive for cough and shortness of breath.    Cardiovascular: Negative.    Gastrointestinal:  Negative for abdominal distention, diarrhea, nausea and vomiting.   Endocrine: Negative.    Musculoskeletal: Negative.    Skin:   Negative for rash.   Allergic/Immunologic: Negative.    Neurological: Negative.    Hematological: Negative.    Psychiatric/Behavioral: Negative.           Current Medications       Current Outpatient Medications:     albuterol (ProAir HFA) 90 mcg/act inhaler, Inhale 2 puffs every 6 (six) hours as needed for shortness of breath, Disp: 8.5 g, Rfl: 0    predniSONE 20 mg tablet, Take 2 tablets (40 mg total) by mouth daily for 5 days, Disp: 10 tablet, Rfl: 0    albuterol (ProAir HFA) 90 mcg/act inhaler, Inhale 2 puffs every 4 (four) hours as needed for wheezing or shortness of breath (Patient not taking: Reported on 6/3/2021), Disp: 8.5 g, Rfl: 0    benzonatate (TESSALON PERLES) 100 mg capsule, Take 1 capsule (100 mg total) by mouth 3 (three) times a day as needed for cough (Patient not taking: Reported on 6/3/2021), Disp: 20 capsule, Rfl: 0    benzonatate (TESSALON PERLES) 100 mg capsule, Take 1 capsule (100 mg total) by mouth 3 (three) times a day as needed for cough, Disp: 20 capsule, Rfl: 0    Current Allergies     Allergies as of 01/21/2024    (No Known Allergies)            The following portions of the patient's history were reviewed and updated as appropriate: allergies, current medications, past family history, past medical history, past social history, past surgical history and problem list.     History reviewed. No pertinent past medical history.    History reviewed. No pertinent surgical history.    History reviewed. No pertinent family history.      Medications have been verified.        Objective   /80   Pulse (!) 114   Temp 98.8 °F (37.1 °C) (Oral)   Resp 18   SpO2 98%        Physical Exam     Physical Exam  Constitutional:       General: He is not in acute distress.     Appearance: He is ill-appearing.   HENT:      Right Ear: Tympanic membrane normal.      Left Ear: Tympanic membrane normal.      Nose: Congestion present. No rhinorrhea.      Mouth/Throat:      Mouth: Mucous membranes are moist.       Pharynx: Oropharynx is clear. No oropharyngeal exudate or posterior oropharyngeal erythema.   Eyes:      Conjunctiva/sclera: Conjunctivae normal.   Cardiovascular:      Rate and Rhythm: Normal rate and regular rhythm.      Heart sounds: Normal heart sounds.   Pulmonary:      Effort: Pulmonary effort is normal. No respiratory distress.      Breath sounds: Normal breath sounds. No wheezing, rhonchi or rales.   Musculoskeletal:      Cervical back: Normal range of motion and neck supple.   Lymphadenopathy:      Cervical: Cervical adenopathy present.   Skin:     General: Skin is warm.   Neurological:      Mental Status: He is alert.   Psychiatric:         Mood and Affect: Mood normal.         Behavior: Behavior normal.

## 2024-01-21 NOTE — LETTER
January 23, 2024     Patient: Kaleb Diallo   YOB: 1980   Date of Visit: 1/21/2024       To Whom it May Concern:    Kaleb Diallo was seen in my clinic on 1/21/24. He may return to work on 1/24/24 .    If you have any questions or concerns, please don't hesitate to call.         Sincerely,          Esther Bowers PA-C        CC: No Recipients

## 2024-01-22 LAB
FLUAV RNA RESP QL NAA+PROBE: POSITIVE
FLUBV RNA RESP QL NAA+PROBE: NEGATIVE
SARS-COV-2 RNA RESP QL NAA+PROBE: NEGATIVE

## 2024-01-29 ENCOUNTER — TELEPHONE (OUTPATIENT)
Dept: URGENT CARE | Facility: CLINIC | Age: 44
End: 2024-01-29

## 2024-01-29 NOTE — TELEPHONE ENCOUNTER
Patient called stating he was seen last weekend and tested positive for the flu. He was given a work note but was informed by his employer that he needed a specific work form filled out. 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